# Patient Record
Sex: MALE | Race: BLACK OR AFRICAN AMERICAN | NOT HISPANIC OR LATINO | URBAN - METROPOLITAN AREA
[De-identification: names, ages, dates, MRNs, and addresses within clinical notes are randomized per-mention and may not be internally consistent; named-entity substitution may affect disease eponyms.]

---

## 2018-09-18 ENCOUNTER — EMERGENCY (EMERGENCY)
Facility: HOSPITAL | Age: 23
LOS: 1 days | Discharge: HOME | End: 2018-09-18
Attending: EMERGENCY MEDICINE
Payer: COMMERCIAL

## 2018-09-18 VITALS
HEART RATE: 65 BPM | WEIGHT: 169.98 LBS | SYSTOLIC BLOOD PRESSURE: 153 MMHG | RESPIRATION RATE: 20 BRPM | DIASTOLIC BLOOD PRESSURE: 97 MMHG | TEMPERATURE: 98 F | OXYGEN SATURATION: 97 % | HEIGHT: 69 IN

## 2018-09-18 VITALS
HEART RATE: 62 BPM | SYSTOLIC BLOOD PRESSURE: 141 MMHG | RESPIRATION RATE: 19 BRPM | DIASTOLIC BLOOD PRESSURE: 76 MMHG | TEMPERATURE: 98 F | OXYGEN SATURATION: 98 %

## 2018-09-18 PROCEDURE — 11740 EVACUATION SUBUNGUAL HMTMA: CPT | Mod: RT

## 2018-09-18 PROCEDURE — 99283 EMERGENCY DEPT VISIT LOW MDM: CPT | Mod: 25

## 2018-09-18 PROCEDURE — 73140 X-RAY EXAM OF FINGER(S): CPT | Mod: 26,RT

## 2018-09-18 PROCEDURE — 73140 X-RAY EXAM OF FINGER(S): CPT

## 2018-09-18 PROCEDURE — 11740 EVACUATION SUBUNGUAL HMTMA: CPT

## 2018-09-18 NOTE — ED PROVIDER NOTE - OBJECTIVE STATEMENT
21yo M presents with R thumb injury. Patient reports he struck R thumb with car door 2 nights ago. Reports pain despite ibuprofen every 6 hours. Reports he is left handed.

## 2018-09-18 NOTE — ED PROVIDER NOTE - MEDICAL DECISION MAKING DETAILS
patient presents with R thumb pain after injury. XR neg for fracture. Attempted nail trephination to moderate evacuation of subungal hematoma. patient stable for discharge

## 2018-09-18 NOTE — ED ADULT NURSE NOTE - NSIMPLEMENTINTERV_GEN_ALL_ED
Implemented All Universal Safety Interventions:  [place] to call system. Call bell, personal items and telephone within reach. Instruct patient to call for assistance. Room bathroom lighting operational. Non-slip footwear when patient is off stretcher. Physically safe environment: no spills, clutter or unnecessary equipment. Stretcher in lowest position, wheels locked, appropriate side rails in place.

## 2018-09-18 NOTE — ED PROVIDER NOTE - LOCATION
finger R distal thumb swelling, subungal hematoma evident, normal flexion/extension at PIP joint, normal sensory function/finger
